# Patient Record
Sex: FEMALE | Race: WHITE | Employment: FULL TIME | URBAN - METROPOLITAN AREA
[De-identification: names, ages, dates, MRNs, and addresses within clinical notes are randomized per-mention and may not be internally consistent; named-entity substitution may affect disease eponyms.]

---

## 2020-08-01 ENCOUNTER — OFFICE VISIT (OUTPATIENT)
Dept: URGENT CARE | Facility: URGENT CARE | Age: 55
End: 2020-08-01
Payer: COMMERCIAL

## 2020-08-01 ENCOUNTER — ANCILLARY PROCEDURE (OUTPATIENT)
Dept: GENERAL RADIOLOGY | Facility: CLINIC | Age: 55
End: 2020-08-01
Attending: NURSE PRACTITIONER
Payer: COMMERCIAL

## 2020-08-01 VITALS
OXYGEN SATURATION: 99 % | TEMPERATURE: 96.8 F | SYSTOLIC BLOOD PRESSURE: 102 MMHG | DIASTOLIC BLOOD PRESSURE: 62 MMHG | HEART RATE: 68 BPM

## 2020-08-01 DIAGNOSIS — M79.671 RIGHT FOOT PAIN: ICD-10-CM

## 2020-08-01 DIAGNOSIS — S92.501A CLOSED FRACTURE OF PHALANX OF RIGHT SECOND TOE, INITIAL ENCOUNTER: Primary | ICD-10-CM

## 2020-08-01 PROCEDURE — 29515 APPLICATION SHORT LEG SPLINT: CPT | Mod: RT | Performed by: NURSE PRACTITIONER

## 2020-08-01 PROCEDURE — 73630 X-RAY EXAM OF FOOT: CPT | Mod: RT

## 2020-08-01 PROCEDURE — 99204 OFFICE O/P NEW MOD 45 MIN: CPT | Mod: 25 | Performed by: NURSE PRACTITIONER

## 2020-08-01 RX ORDER — ATORVASTATIN CALCIUM 10 MG/1
10 TABLET, FILM COATED ORAL DAILY
COMMUNITY

## 2020-08-01 RX ORDER — SERTRALINE HYDROCHLORIDE 100 MG/1
100 TABLET, FILM COATED ORAL
COMMUNITY

## 2020-08-01 NOTE — PATIENT INSTRUCTIONS
Patient Education     Discharge Instructions: Caring for Your Splint  You will be going home with a splint. This is sometimes called a removable cast. A splint helps your body heal by holding your injured bones or joints in place. Take good care of your splint. A damaged splint can keep your injury from healing well. If your splint becomes damaged or loses its shape, you may need to replace it.   You have a broken ___________________ bone.  This bone is located in your ____________.   Home care    Wear your splint according to your doctor s instructions.    Keep the splint dry at all times. Bathe with your splint well out of the water. You can hold the splint outside the tub or shower when bathing. Protect it with a large plastic bag closed at the top end with a rubber band. Use two layers of plastic to help keep the splint dry. Or you can buy a waterproof shield.    If a splint gets wet, dry it with a hair dryer on the  cool  setting. Don t use the warm or hot setting, because those settings can burn your skin.    Always keep the splint clean and away from dirt.    Wash the Velcro straps and inner cloth sleeve (stockinet) with soapy water and air dry.    Keep your splint away from open flames.    Don t expose your splint to heat, space heaters, or prolonged sunlight. Excessive heat will cause the splint to change shape.    Don t cut or tear the splint.     Exercise all the nearby joints not kept still by the splint. If you have a long leg splint, exercise your hip joint and your toes. If you have an arm splint, exercise your shoulder, elbow, thumb, and fingers.    Elevate the part of your body that is in the splint. This helps reduce swelling.  Follow-up care  Make a follow-up appointment with your healthcare provider, or as advised.  When to call your healthcare provider  Call your healthcare provider right away if you have any of these:    Tingling or numbness in the affected area    Severe pain that cannot be  relieved with medicine    Cast that feels too tight or too loose    Swelling, coldness, or blue-gray color in the fingers or toes    Cast that is damaged, cracked, or has rough edges that hurt    Pressure sores or red marks that don t go away within 1 hour after removing the splint    Blisters   Date Last Reviewed: 7/1/2016 2000-2019 [employer]. [address] [place] [address]. All rights reserved. This information is not intended as a substitute for professional medical care. Always follow your healthcare professional's instructions.

## 2020-08-01 NOTE — PROGRESS NOTES
SUBJECTIVE:   Shelbie Machado is a 54 year old female presenting with a chief complaint of right foot pain since Thursday when she slipped on the stairs and rolled her foot forward and onto herself while falling. Unable to walk on forefoot, can walk on right heel. No previous injury to this foot.     Onset of symptoms was 3 day(s) ago.  Course of illness is same.    Severity moderate  Previous Treatment Ibuprofen      History reviewed. No pertinent past medical history.  Current Outpatient Medications   Medication Sig Dispense Refill     atorvastatin (LIPITOR) 10 MG tablet Take 10 mg by mouth daily       sertraline (ZOLOFT) 100 MG tablet Take 100 mg by mouth       Social History     Tobacco Use     Smoking status: Never Smoker     Smokeless tobacco: Never Used   Substance Use Topics     Alcohol use: Not on file     History reviewed. No pertinent family history.     ROS:10 point ROS neg other than the symptoms noted above in the HPI.     OBJECTIVE  /62   Pulse 68   Temp 96.8  F (36  C) (Tympanic)   SpO2 99%       GENERAL APPEARANCE: healthy appearing, alert     EYES: PERRL, EOMI, sclera non-icteric     HENT: oral exam benign, mucus membranes intact, without ulcers or lesions     NECK: no adenopathy or asymmetry, thyroid normal to palpation  CARDIAC: S1, S2 regular rhythm     MS: extremities normal- no gross deformities noted; normal muscle tone, Except for right forefoot, which is swollen and tender with limiited motion of the toes     SKIN: no suspicious lesions or rashes     NEURO: Normal strength and tone, mentation intact and speech normal     PSYCH: normal thought process; no significant mood disturbance    Right foot x-ray shows nondisplaced fracture of the medial aspect of  the base of the proximal phalanx of the second toe with  intra-articular extension as read by this provider.    Posterior leg fiberglass splint applied by this proivder without difficulty to right foot and leg.  CMS to toes was  normal 15 minutes after splint application.    ASSESSMENT/PLAN:      ICD-10-CM    1. Closed fracture of phalanx of right second toe, initial encounter  S92.501A Orthopedic & Spine  Referral   2. Right foot pain  M79.671 XR Foot Right G/E 3 Views        Follow Up:  See Ortho in 3-5 days.    Patient Instructions       Patient Education     Discharge Instructions: Caring for Your Splint  You will be going home with a splint. This is sometimes called a removable cast. A splint helps your body heal by holding your injured bones or joints in place. Take good care of your splint. A damaged splint can keep your injury from healing well. If your splint becomes damaged or loses its shape, you may need to replace it.   You have a broken ___________________ bone.  This bone is located in your ____________.   Home care    Wear your splint according to your doctor s instructions.    Keep the splint dry at all times. Bathe with your splint well out of the water. You can hold the splint outside the tub or shower when bathing. Protect it with a large plastic bag closed at the top end with a rubber band. Use two layers of plastic to help keep the splint dry. Or you can buy a waterproof shield.    If a splint gets wet, dry it with a hair dryer on the  cool  setting. Don t use the warm or hot setting, because those settings can burn your skin.    Always keep the splint clean and away from dirt.    Wash the Velcro straps and inner cloth sleeve (stockinet) with soapy water and air dry.    Keep your splint away from open flames.    Don t expose your splint to heat, space heaters, or prolonged sunlight. Excessive heat will cause the splint to change shape.    Don t cut or tear the splint.     Exercise all the nearby joints not kept still by the splint. If you have a long leg splint, exercise your hip joint and your toes. If you have an arm splint, exercise your shoulder, elbow, thumb, and fingers.    Elevate the part of your body  that is in the splint. This helps reduce swelling.  Follow-up care  Make a follow-up appointment with your healthcare provider, or as advised.  When to call your healthcare provider  Call your healthcare provider right away if you have any of these:    Tingling or numbness in the affected area    Severe pain that cannot be relieved with medicine    Cast that feels too tight or too loose    Swelling, coldness, or blue-gray color in the fingers or toes    Cast that is damaged, cracked, or has rough edges that hurt    Pressure sores or red marks that don t go away within 1 hour after removing the splint    Blisters   Date Last Reviewed: 7/1/2016 2000-2019 [employer]. [address] [place] [address], [place] [address]. All rights reserved. This information is not intended as a substitute for professional medical care. Always follow your healthcare professional's instructions.               ABDULLAHI Owen, CNP  [facility] Provider

## 2020-08-05 ENCOUNTER — OFFICE VISIT (OUTPATIENT)
Dept: ORTHOPEDICS | Facility: CLINIC | Age: 55
End: 2020-08-05
Attending: NURSE PRACTITIONER
Payer: COMMERCIAL

## 2020-08-05 VITALS
BODY MASS INDEX: 23.18 KG/M2 | WEIGHT: 130.8 LBS | DIASTOLIC BLOOD PRESSURE: 72 MMHG | SYSTOLIC BLOOD PRESSURE: 112 MMHG | HEIGHT: 63 IN

## 2020-08-05 DIAGNOSIS — S92.511A CLOSED DISPLACED FRACTURE OF PROXIMAL PHALANX OF LESSER TOE OF RIGHT FOOT, INITIAL ENCOUNTER: Primary | ICD-10-CM

## 2020-08-05 PROCEDURE — 28510 TREATMENT OF TOE FRACTURE: CPT | Mod: RT | Performed by: ORTHOPAEDIC SURGERY

## 2020-08-05 PROCEDURE — 99203 OFFICE O/P NEW LOW 30 MIN: CPT | Mod: 57 | Performed by: ORTHOPAEDIC SURGERY

## 2020-08-05 ASSESSMENT — MIFFLIN-ST. JEOR: SCORE: 1172.43

## 2020-08-05 NOTE — PATIENT INSTRUCTIONS
Follow up in 1 month.   Short walking boot provided today.   Able to WB as tolerated.   Ice and use of OTC aids for pain management.     Call my office with any questions or concerns, [phone].

## 2020-08-05 NOTE — LETTER
8/5/2020         RE: Shelbie Machado  [address] [place] [address]        Dear Colleague,    Thank you for referring your patient, Shelbie Machado, to the [facility] ORTHOPEDIC SURGERY. Please see a copy of my visit note below.    HISTORY OF PRESENT ILLNESS:    Shelbie Machado is a 54 year old female who is seen in consultation at the request of Dr. Hearn for right second toe fracture.  Injury occurred on  7/30/20. Patient reports injury to fall down the stairs. She states that at the time it was painful, but tolerable with NWB status.  Patient was seen in  on 8/1/20 where she was placed in posterior slab splint.  Patient currently working at [employer].    Present symptoms: pain located at the proximal aspect of the second toe. Patient reports mild discoloration and swelling prior to being seen in . She has remained in posterior slab splint.   Current pain level: 2/10, Worst pain level: 4/10   Treatments tried to this point: Tylenol, Ibuprofen, no recent use of ice since application of splint.   Orthopedic PMH: No previous right foot or ankle injuries.      No past medical history on file.  None  No past surgical history on file.  Left ankle surgery    No family history on file.  No musculoskeletal problems    Social History     Socioeconomic History     Marital status:      Spouse name: Not on file     Number of children: Not on file     Years of education: Not on file     Highest education level: Not on file   Occupational History     Not on file   Social Needs     Financial resource strain: Not on file     Food insecurity     Worry: Not on file     Inability: Not on file     Transportation needs     Medical: Not on file     Non-medical: Not on file   Tobacco Use     Smoking status: Never Smoker     Smokeless tobacco: Never Used   Substance and Sexual Activity     Alcohol use: Not on file     Drug use: Not on file     Sexual activity: Not on file   Lifestyle     Physical activity     Days per  week: Not on file     Minutes per session: Not on file     Stress: Not on file   Relationships     Social connections     Talks on phone: Not on file     Gets together: Not on file     Attends Judaism service: Not on file     Active member of club or organization: Not on file     Attends meetings of clubs or organizations: Not on file     Relationship status: Not on file     Intimate partner violence     Fear of current or ex partner: Not on file     Emotionally abused: Not on file     Physically abused: Not on file     Forced sexual activity: Not on file   Other Topics Concern     Not on file   Social History Narrative     Not on file       Current Outpatient Medications   Medication Sig Dispense Refill     atorvastatin (LIPITOR) 10 MG tablet Take 10 mg by mouth daily       sertraline (ZOLOFT) 100 MG tablet Take 100 mg by mouth         Allergies   Allergen Reactions     Sulfa Drugs Hives     Other reaction(s): Unknown/Not Verified       REVIEW OF SYSTEMS:  CONSTITUTIONAL:  NEGATIVE for fever, chills, change in weight  INTEGUMENTARY/SKIN:  NEGATIVE for worrisome rashes, moles or lesions  EYES:  NEGATIVE for vision changes or irritation  ENT/MOUTH:  NEGATIVE for ear, mouth and throat problems  RESP:  NEGATIVE for significant cough or SOB  BREAST:  NEGATIVE for masses, tenderness or discharge  CV:  NEGATIVE for chest pain, palpitations or peripheral edema  GI:  NEGATIVE for nausea, abdominal pain, heartburn, or change in bowel habits  :  Negative   MUSCULOSKELETAL:  See HPI above  NEURO:  NEGATIVE for weakness, dizziness or paresthesias  ENDOCRINE:  NEGATIVE for temperature intolerance, skin/hair changes  HEME/ALLERGY/IMMUNE:  NEGATIVE for bleeding problems  PSYCHIATRIC:  NEGATIVE for changes in mood or affect      PHYSICAL EXAM:  /72 (BP Location: Right arm, Patient Position: Chair, Cuff Size: Adult Regular)   There is no height or weight on file to calculate BMI.   GENERAL APPEARANCE: healthy, alert and no  distress   HEENT: No apparent thyroid megaly. Clear sclera with normal ocular movement  RESPIRATORY: No labored breathing  SKIN: no suspicious lesions or rashes  NEURO: Normal strength and tone, mentation intact and speech normal  VASCULAR: Good pulses, and capillary refill   LYMPH: no lymphadenopathy   PSYCH:  mentation appears normal and affect normal/bright    MUSCULOSKELETAL:  Out of the splint, right foot is noted to be slightly swollen  Mild discoloration/ecchymosis in the second toe region  Tenderness at the MCP joint  Skin is intact  Sensation is grossly intact  Circulation is intact  She has lateral deviation of the distal phalanx of the second toe which was pre-existing  The DIP joint is not painful     ASSESSMENT:  Abelson fracture at the base of proximal phalanx, second toe, right    PLAN:  the x-ray images of August 1, 2020 were visualized.  Findings are thoroughly explained.  Nonoperative treatment with immobilization is appropriate.  We talked about postop shoe versus short cam boot.  She preferred cam boot which would be actually more helpful for having roll from the sole of the boot.  I recommended follow-up in 1 month with a new x-rays.  She obviously does not need to wear the boot constantly.  Wear it only when she is weightbearing.  Elevation for swelling control as needed.        Imaging Interpretation:     Recent Results (from the past 744 hour(s))   XR Foot Right G/E 3 Views    Narrative    XR FOOT RT G/E 3 VW 8/1/2020 1:26 PM    HISTORY: Right foot pain    COMPARISON: None.    FINDINGS: Acute oblique nondisplaced fracture of the medial aspect of  the base of the proximal phalanx of the second toe with  intra-articular extension. No evidence of additional fractures.    MD Sukhwinder RUSSELL MD  Department of Orthopedic Surgery        Disclaimer: This note consists of symbols derived from keyboarding, dictation and/or voice recognition software. As a result, there may be  errors in the script that have gone undetected. Please consider this when interpreting information found in this chart.      Again, thank you for allowing me to participate in the care of your patient.        Sincerely,        Sukhwinder Crawford MD

## 2020-08-05 NOTE — PROGRESS NOTES
HISTORY OF PRESENT ILLNESS:    Shelbie Machado is a 54 year old female who is seen in consultation at the request of Dr. Hearn for right second toe fracture.  Injury occurred on  7/30/20. Patient reports injury to fall down the stairs. She states that at the time it was painful, but tolerable with NWB status.  Patient was seen in  on 8/1/20 where she was placed in posterior slab splint.  Patient currently working at [employer].    Present symptoms: pain located at the proximal aspect of the second toe. Patient reports mild discoloration and swelling prior to being seen in . She has remained in posterior slab splint.   Current pain level: 2/10, Worst pain level: 4/10   Treatments tried to this point: Tylenol, Ibuprofen, no recent use of ice since application of splint.   Orthopedic PMH: No previous right foot or ankle injuries.      No past medical history on file.  None  No past surgical history on file.  Left ankle surgery    No family history on file.  No musculoskeletal problems    Social History     Socioeconomic History     Marital status:      Spouse name: Not on file     Number of children: Not on file     Years of education: Not on file     Highest education level: Not on file   Occupational History     Not on file   Social Needs     Financial resource strain: Not on file     Food insecurity     Worry: Not on file     Inability: Not on file     Transportation needs     Medical: Not on file     Non-medical: Not on file   Tobacco Use     Smoking status: Never Smoker     Smokeless tobacco: Never Used   Substance and Sexual Activity     Alcohol use: Not on file     Drug use: Not on file     Sexual activity: Not on file   Lifestyle     Physical activity     Days per week: Not on file     Minutes per session: Not on file     Stress: Not on file   Relationships     Social connections     Talks on phone: Not on file     Gets together: Not on file     Attends Denominational service: Not on file     Active member of  club or organization: Not on file     Attends meetings of clubs or organizations: Not on file     Relationship status: Not on file     Intimate partner violence     Fear of current or ex partner: Not on file     Emotionally abused: Not on file     Physically abused: Not on file     Forced sexual activity: Not on file   Other Topics Concern     Not on file   Social History Narrative     Not on file       Current Outpatient Medications   Medication Sig Dispense Refill     atorvastatin (LIPITOR) 10 MG tablet Take 10 mg by mouth daily       sertraline (ZOLOFT) 100 MG tablet Take 100 mg by mouth         Allergies   Allergen Reactions     Sulfa Drugs Hives     Other reaction(s): Unknown/Not Verified       REVIEW OF SYSTEMS:  CONSTITUTIONAL:  NEGATIVE for fever, chills, change in weight  INTEGUMENTARY/SKIN:  NEGATIVE for worrisome rashes, moles or lesions  EYES:  NEGATIVE for vision changes or irritation  ENT/MOUTH:  NEGATIVE for ear, mouth and throat problems  RESP:  NEGATIVE for significant cough or SOB  BREAST:  NEGATIVE for masses, tenderness or discharge  CV:  NEGATIVE for chest pain, palpitations or peripheral edema  GI:  NEGATIVE for nausea, abdominal pain, heartburn, or change in bowel habits  :  Negative   MUSCULOSKELETAL:  See HPI above  NEURO:  NEGATIVE for weakness, dizziness or paresthesias  ENDOCRINE:  NEGATIVE for temperature intolerance, skin/hair changes  HEME/ALLERGY/IMMUNE:  NEGATIVE for bleeding problems  PSYCHIATRIC:  NEGATIVE for changes in mood or affect      PHYSICAL EXAM:  /72 (BP Location: Right arm, Patient Position: Chair, Cuff Size: Adult Regular)   There is no height or weight on file to calculate BMI.   GENERAL APPEARANCE: healthy, alert and no distress   HEENT: No apparent thyroid megaly. Clear sclera with normal ocular movement  RESPIRATORY: No labored breathing  SKIN: no suspicious lesions or rashes  NEURO: Normal strength and tone, mentation intact and speech normal  VASCULAR:  Good pulses, and capillary refill   LYMPH: no lymphadenopathy   PSYCH:  mentation appears normal and affect normal/bright    MUSCULOSKELETAL:  Out of the splint, right foot is noted to be slightly swollen  Mild discoloration/ecchymosis in the second toe region  Tenderness at the MCP joint  Skin is intact  Sensation is grossly intact  Circulation is intact  She has lateral deviation of the distal phalanx of the second toe which was pre-existing  The DIP joint is not painful     ASSESSMENT:  Abelson fracture at the base of proximal phalanx, second toe, right    PLAN:  the x-ray images of August 1, 2020 were visualized.  Findings are thoroughly explained.  Nonoperative treatment with immobilization is appropriate.  We talked about postop shoe versus short cam boot.  She preferred cam boot which would be actually more helpful for having roll from the sole of the boot.  I recommended follow-up in 1 month with a new x-rays.  She obviously does not need to wear the boot constantly.  Wear it only when she is weightbearing.  Elevation for swelling control as needed.        Imaging Interpretation:     Recent Results (from the past 744 hour(s))   XR Foot Right G/E 3 Views    Narrative    XR FOOT RT G/E 3 VW 8/1/2020 1:26 PM    HISTORY: Right foot pain    COMPARISON: None.    FINDINGS: Acute oblique nondisplaced fracture of the medial aspect of  the base of the proximal phalanx of the second toe with  intra-articular extension. No evidence of additional fractures.    MD Sukhwinder RUSSELL MD  Department of Orthopedic Surgery        Disclaimer: This note consists of symbols derived from keyboarding, dictation and/or voice recognition software. As a result, there may be errors in the script that have gone undetected. Please consider this when interpreting information found in this chart.

## 2020-09-09 ENCOUNTER — OFFICE VISIT (OUTPATIENT)
Dept: ORTHOPEDICS | Facility: CLINIC | Age: 55
End: 2020-09-09
Payer: COMMERCIAL

## 2020-09-09 ENCOUNTER — ANCILLARY PROCEDURE (OUTPATIENT)
Dept: GENERAL RADIOLOGY | Facility: CLINIC | Age: 55
End: 2020-09-09
Attending: ORTHOPAEDIC SURGERY
Payer: COMMERCIAL

## 2020-09-09 VITALS
DIASTOLIC BLOOD PRESSURE: 62 MMHG | HEIGHT: 63 IN | BODY MASS INDEX: 22.86 KG/M2 | SYSTOLIC BLOOD PRESSURE: 100 MMHG | WEIGHT: 129 LBS

## 2020-09-09 DIAGNOSIS — S92.511A CLOSED DISPLACED FRACTURE OF PROXIMAL PHALANX OF LESSER TOE OF RIGHT FOOT, INITIAL ENCOUNTER: Primary | ICD-10-CM

## 2020-09-09 DIAGNOSIS — S92.511A CLOSED DISPLACED FRACTURE OF PROXIMAL PHALANX OF LESSER TOE OF RIGHT FOOT, INITIAL ENCOUNTER: ICD-10-CM

## 2020-09-09 PROCEDURE — 73630 X-RAY EXAM OF FOOT: CPT | Mod: RT | Performed by: ORTHOPAEDIC SURGERY

## 2020-09-09 PROCEDURE — 99207 ZZC FRACTURE CARE IN GLOBAL PERIOD: CPT | Performed by: ORTHOPAEDIC SURGERY

## 2020-09-09 ASSESSMENT — MIFFLIN-ST. JEOR: SCORE: 1159.27

## 2020-09-09 NOTE — PATIENT INSTRUCTIONS
Avoid strenuous activities for another 5 to 6 weeks and resume full activities at that point.  If she has any symptoms, we will revisit the possibility of having to do anything surgical at that point.  In the meantime regardless of radiographic findings, she should progress her activity level as tolerated.  Follow-up as needed.

## 2020-09-09 NOTE — PROGRESS NOTES
"HISTORY OF PRESENT ILLNESS:    Shelbie Machado is a 55 year old female who is seen in follow up for Right second toe fracture.  Date of injury: 7/30/20, she is 6 weeks out from injury.  Since her last office visit on 8/5/20 she reports her toe has been doing well.  She reports mild discomfort with prolonged standing, or if she were to stand on her tip-toes. She has discontinued use of her walking boot 1 week ago, and is tolerating her normal footwear well. She denies any new or incurrent trauma since her injury.   Treatments tried to this point: short pneumatic walking boot, ice, ibuprofen, Tylenol.   Past Medical History: Unchanged from the visit of 8/5/20. Please refer to that note.    REVIEW OF SYSTEMS:  CONSTITUTIONAL:  NEGATIVE for fever, chills, change in weight  INTEGUMENTARY/SKIN:  NEGATIVE for worrisome rashes, moles or lesions  EYES:  NEGATIVE for vision changes or irritation  ENT/MOUTH:  NEGATIVE for ear, mouth and throat problems  RESP:  NEGATIVE for significant cough or SOB  BREAST:  NEGATIVE for masses, tenderness or discharge  CV:  NEGATIVE for chest pain, palpitations or peripheral edema  GI:  NEGATIVE for nausea, abdominal pain, heartburn, or change in bowel habits  :  Negative   MUSCULOSKELETAL:  See HPI above  NEURO:  NEGATIVE for weakness, dizziness or paresthesias  ENDOCRINE:  NEGATIVE for temperature intolerance, skin/hair changes  HEME/ALLERGY/IMMUNE:  NEGATIVE for bleeding problems  PSYCHIATRIC:  NEGATIVE for changes in mood or affect    PHYSICAL EXAM:  /62   Ht 1.6 m (5' 3\")   Wt 58.5 kg (129 lb)   BMI 22.85 kg/m    Body mass index is 22.85 kg/m .   GENERAL APPEARANCE: healthy, alert and no distress   SKIN: no suspicious lesions or rashes  NEURO: Normal strength and tone, mentation intact and speech normal  VASCULAR:  good pulses, and cappillary refill   LYMPH: no lymphadenopathy   PSYCH:  mentation appears normal and affect normal/bright    MSK:  Swelling of the right foot is " almost nonexistent at this point  Range of motion of the MTP joint is not painful  No particular tenderness at the base of proximal phalanx, second toe, right foot  Circulation is intact  Sensation is intact  She is able to walk without any limp  Skin is intact    IMAGING INTERPRETATION:  At the fracture site.  No noticeable callus formation is noted at the fracture site.      ASSESSMENT:  Second toe fracture at the base of proximal phalanx, right foot    PLAN:  The x-ray images from today were visualized.  Findings are thoroughly explained.  Despite persisting space at the fracture site, she is basically asymptomatic at this point.  Avoid strenuous activities for another 5 to 6 weeks and resume full activities at that point.  If she has any symptoms, we will revisit the possibility of having to do anything surgical at that point.  In the meantime regardless of radiographic findings, she should progress her activity level as tolerated.  Follow-up as needed.           Sukhwinder Crawford MD  Dept. Orthopedic Surgery  [facility]       Disclaimer: This note consists of symbols derived from keyboarding, dictation and/or voice recognition software. As a result, there may be errors in the script that have gone undetected. Please consider this when interpreting information found in this chart.

## 2020-09-09 NOTE — LETTER
"    9/9/2020         RE: Shelbie Machado  [address] [place] [address]        Dear Colleague,    Thank you for referring your patient, Shelbie Machado, to the [facility] ORTHOPEDIC SURGERY. Please see a copy of my visit note below.    HISTORY OF PRESENT ILLNESS:    Shelbie Machado is a 55 year old female who is seen in follow up for Right second toe fracture.  Date of injury: 7/30/20, she is 6 weeks out from injury.  Since her last office visit on 8/5/20 she reports her toe has been doing well.  She reports mild discomfort with prolonged standing, or if she were to stand on her tip-toes. She has discontinued use of her walking boot 1 week ago, and is tolerating her normal footwear well. She denies any new or incurrent trauma since her injury.   Treatments tried to this point: short pneumatic walking boot, ice, ibuprofen, Tylenol.   Past Medical History: Unchanged from the visit of 8/5/20. Please refer to that note.    REVIEW OF SYSTEMS:  CONSTITUTIONAL:  NEGATIVE for fever, chills, change in weight  INTEGUMENTARY/SKIN:  NEGATIVE for worrisome rashes, moles or lesions  EYES:  NEGATIVE for vision changes or irritation  ENT/MOUTH:  NEGATIVE for ear, mouth and throat problems  RESP:  NEGATIVE for significant cough or SOB  BREAST:  NEGATIVE for masses, tenderness or discharge  CV:  NEGATIVE for chest pain, palpitations or peripheral edema  GI:  NEGATIVE for nausea, abdominal pain, heartburn, or change in bowel habits  :  Negative   MUSCULOSKELETAL:  See HPI above  NEURO:  NEGATIVE for weakness, dizziness or paresthesias  ENDOCRINE:  NEGATIVE for temperature intolerance, skin/hair changes  HEME/ALLERGY/IMMUNE:  NEGATIVE for bleeding problems  PSYCHIATRIC:  NEGATIVE for changes in mood or affect    PHYSICAL EXAM:  /62   Ht 1.6 m (5' 3\")   Wt 58.5 kg (129 lb)   BMI 22.85 kg/m    Body mass index is 22.85 kg/m .   GENERAL APPEARANCE: healthy, alert and no distress   SKIN: no suspicious lesions or " rashes  NEURO: Normal strength and tone, mentation intact and speech normal  VASCULAR:  good pulses, and cappillary refill   LYMPH: no lymphadenopathy   PSYCH:  mentation appears normal and affect normal/bright    MSK:  Swelling of the right foot is almost nonexistent at this point  Range of motion of the MTP joint is not painful  No particular tenderness at the base of proximal phalanx, second toe, right foot  Circulation is intact  Sensation is intact  She is able to walk without any limp  Skin is intact    IMAGING INTERPRETATION:  At the fracture site.  No noticeable callus formation is noted at the fracture site.      ASSESSMENT:  Second toe fracture at the base of proximal phalanx, right foot    PLAN:  The x-ray images from today were visualized.  Findings are thoroughly explained.  Despite persisting space at the fracture site, she is basically asymptomatic at this point.  Avoid strenuous activities for another 5 to 6 weeks and resume full activities at that point.  If she has any symptoms, we will revisit the possibility of having to do anything surgical at that point.  In the meantime regardless of radiographic findings, she should progress her activity level as tolerated.  Follow-up as needed.           Sukhwinder Crawford MD  Dept. Orthopedic Surgery  [facility]       Disclaimer: This note consists of symbols derived from keyboarding, dictation and/or voice recognition software. As a result, there may be errors in the script that have gone undetected. Please consider this when interpreting information found in this chart.      Again, thank you for allowing me to participate in the care of your patient.        Sincerely,        Sukhwinder Crawford MD

## 2020-11-07 ENCOUNTER — HEALTH MAINTENANCE LETTER (OUTPATIENT)
Age: 55
End: 2020-11-07

## 2021-03-27 ENCOUNTER — HEALTH MAINTENANCE LETTER (OUTPATIENT)
Age: 56
End: 2021-03-27

## 2021-09-11 ENCOUNTER — HEALTH MAINTENANCE LETTER (OUTPATIENT)
Age: 56
End: 2021-09-11

## 2022-01-01 ENCOUNTER — HEALTH MAINTENANCE LETTER (OUTPATIENT)
Age: 57
End: 2022-01-01

## 2022-04-23 ENCOUNTER — HEALTH MAINTENANCE LETTER (OUTPATIENT)
Age: 57
End: 2022-04-23

## 2022-10-29 ENCOUNTER — HEALTH MAINTENANCE LETTER (OUTPATIENT)
Age: 57
End: 2022-10-29

## 2023-04-08 ENCOUNTER — HEALTH MAINTENANCE LETTER (OUTPATIENT)
Age: 58
End: 2023-04-08